# Patient Record
(demographics unavailable — no encounter records)

---

## 2022-06-30 RX ORDER — MONTELUKAST SODIUM 10 MG/1
TABLET ORAL
COMMUNITY
Start: 2021-07-01

## 2022-06-30 RX ORDER — FLUOXETINE HYDROCHLORIDE 20 MG/1
1 CAPSULE ORAL
COMMUNITY
Start: 2022-04-19

## 2022-06-30 RX ORDER — INSULIN DEGLUDEC INJECTION 100 U/ML
INJECTION, SOLUTION SUBCUTANEOUS
COMMUNITY
Start: 2022-04-19

## 2022-07-20 LAB
ANION GAP SERPL CALC-SCNC: 11 MMOL/L (ref 2–17)
APPEARANCE UR: CLEAR
APPEARANCE: CLEAR
BASOPHILS # BLD AUTO: 0.1 X10E3/MCL (ref 0–0.2)
BASOPHILS NFR BLD AUTO: 1.2 % (ref 0–2)
BILIRUB UR STRIP.AUTO-MCNC: NEGATIVE MG/DL
BILIRUBIN, URINE, POC: NEGATIVE
BLOOD URINE, POC: ABNORMAL
BUN SERPL-MCNC: 14 MG/DL (ref 6–20)
CALCIUM SERPL-MCNC: 9.5 MG/DL (ref 8.6–10)
CHLORIDE SERPL-SCNC: 98 MMOL/L (ref 98–107)
COLOR UR: YELLOW
CREAT SERPL-MCNC: 0.5 MG/DL (ref 0.5–1)
DEPRECATED HCO3 PLAS-SCNC: 23 MMOL/L (ref 22–29)
EOSINOPHIL # BLD AUTO: 0.2 X10E3/MCL (ref 0–0.5)
EOSINOPHIL NFR BLD AUTO: 3.2 % (ref 0–7)
ERYTHROCYTE [DISTWIDTH] IN BLOOD BY AUTOMATED COUNT: 12.6 % (ref 11–16)
GFR SERPL CREATININE-BSD FRML MDRD: 108 ML/MIN/1.73M²
GLUCOSE BLD-MCNC: 340 MG/DL (ref 65–110)
GLUCOSE BLD-MCNC: 413 MG/DL (ref 65–110)
GLUCOSE SERPL-MCNC: 379 MG/DL (ref 70–99)
GLUCOSE UR STRIP.AUTO-MCNC: >=1000 MG/DL
GLUCOSE URINE, POC: >=1000 MG/DL
HCT VFR BLD AUTO: 41.8 % (ref 34–47)
HGB BLD-MCNC: 14.6 G/DL (ref 11.5–15.7)
IMMATURE GRANS (ABS): 0.01 X10E3/MCL (ref 0–0.06)
IMMATURE GRANULOCYTES: 0.1 % (ref 0–0.6)
KETONES UR STRIP.AUTO-MCNC: NEGATIVE MG/DL
KETONES, URINE, POC: NEGATIVE MG/DL
KETONES: NEGATIVE
LEUKOCYTE EST, POC: NEGATIVE
LEUKOCYTE ESTERASE UR QL STRIP: NEGATIVE
LYMPHOCYTES # SPEC AUTO: 2.3 X10E3/MCL (ref 1–3.2)
LYMPHOCYTES NFR BLD AUTO: 31.2 % (ref 15–45)
MAGNESIUM SERPL-MCNC: 2 MG/DL (ref 1.6–2.6)
MCH RBC QN AUTO: 29.1 PG (ref 27–34.5)
MCHC RBC AUTO-ENTMCNC: 34.9 G/DL (ref 32–36)
MCV RBC AUTO: 83.3 FL (ref 81–99)
MONOCYTES # BLD AUTO: 0.4 X10E3/MCL (ref 0.3–1)
MONOCYTES NFR BLD AUTO: 5.4 % (ref 4–12)
NEUTROPHILS # BLD AUTO: 4.4 X10E3/MCL (ref 1.6–7.3)
NEUTROPHILS NFR BLD AUTO: 58.9 % (ref 42–74)
NITRATE, URINE POC: NEGATIVE
NITRITE UR QL STRIP.AUTO: NEGATIVE
OSMOLALITY SERPL CALC.SUM OF ELEC: 281 MOSM/KG (ref 270–287)
PH UR STRIP.AUTO: 5.5 [PH] (ref 4.5–8)
PH, URINE, POC: 5.5 (ref 4.5–8)
PLATELET # BLD AUTO: 226 X10E3/MCL (ref 140–440)
PMV BLD AUTO: 10.2 FL (ref 7.2–13.2)
POTASSIUM SERPL-SCNC: 4.3 MMOL/L (ref 3.5–5.3)
POTASSIUM SERPL-SCNC: ABNORMAL MMOL/L (ref 3.5–5.3)
PROT UR QL STRIP: NEGATIVE
PROTEIN,URINE, POC: NEGATIVE
RBC # BLD AUTO: 5.02 X10E6/MCL (ref 3.6–5.2)
RBC # UR STRIP: NEGATIVE /UL
SODIUM SERPL-SCNC: 132 MMOL/L (ref 135–145)
SP GR UR STRIP: 1.02 (ref 1–1.03)
SPECIFIC GRAVITY, URINE, POC: 1.02 (ref 1–1.03)
URINALYSIS COLOR, POC: YELLOW
UROBILIN U POC: 0.2 EU/DL
UROBILINOGEN UR STRIP-MCNC: 0.2 EU/DL
WBC # BLD AUTO: 7.4 X10E3/MCL (ref 3.8–10.6)

## 2022-07-21 LAB
FINAL REPORT: NORMAL

## 2022-07-22 LAB
FINAL REPORT: NORMAL
ORGANISM: NORMAL

## 2022-10-17 NOTE — DISCHARGE SUMMARY
ED Clinical Summary                         [facility]  [address]  [place], [place], [address] [phone]           PERSON INFORMATION  Name: Claudio Natarajan Age:  61 Years : 1962   Sex: Female Language: English PCP: Rowena SNYDER   Marital Status:   Phone: [phone] Med Service: MED-Medicine   MRN:  [id number] Acct# [de-identified] Arrival: 2022 09:24:00   Visit Reason: Dysuria; Flank pain; LOWER BACK PAIN/PAINFUL URINATION Acuity: 3 LOS: 000 02:14   Address:      [address] [place] [address]  Diagnosis:      Back pain; Hyperglycemia; Noncompliance with medication regimen; Vaginal pruritus  Printed Prescriptions: Allergies      sulfa drugs (Itching)      Medications Administered During Visit:                  Medication Dose Route   Sodium Chloride 0.9% 1000 mL IV Piggyback   ketorolac 15 mg IV Push       Patient Medication List:              azelastine nasal (azelastine 137 mcg/inh ( 0.1%) nasal spray) Nasal (into the nose) 2 times a day. cyclobenzaprine (Flexeril 10 mg oral tablet) 1 Tabs Oral (given by mouth) At Bedtime (Once a Day) as needed moderate pain (4-7) for 15 Days. Refills: 0., \" THIS MEDICATION IS ASSOCIATED  WITH  AN INCREASED RISK OF FALLS. \"  fluconazole (Diflucan 150 mg oral tablet) take 1 tab every 3 days as needed for yeast infection. Refills: 0.  hydrOXYzine (hydrOXYzine hydrochloride 25 mg oral tablet) 1 Tabs Oral (given by mouth) 4 times a day as needed for anxiety., SOUND ALIKE / LOOK ALIKE - VERIFY DRUG  lidocaine topical (lidocaine 4% topical film) 1 Patches Topical (on the skin) every day for 30 Days. Refills: 0.  lisinopril (lisinopril 40 mg oral tablet) 1 Tabs Oral (given by mouth) once a day (in the morning). meclizine (Antivert 25 mg oral tablet) Oral (given by mouth) 3 times a day. metFORMIN (metFORMIN 500 mg oral tablet) 2 Tabs Oral (given by mouth) 2 times a day.  2 tablets in the morning and 1 tablet in the evening. naproxen (naproxen sodium) Oral (given by mouth). omeprazole (PriLOSEC 40 mg oral delayed release capsule) 1 Capsules Oral (given by mouth) every day. rosuvastatin (rosuvastatin 10 mg oral tablet) 1 Tabs Oral (given by mouth) every day. sertraline (sertraline 50 mg oral tablet) 2 Tabs Oral (given by mouth) once a day (in the morning). , SOUND ALIKE / LOOK ALIKE - VERIFY DRUG  sitaGLIPtin (Januvia 50 mg oral tablet) 1 Tabs Oral (given by mouth) every day. venlafaxine (Effexor XR 37.5 mg oral capsule, extended release) 1 Capsules Oral (given by mouth) every day. Major Tests and Procedures: The following procedures and tests were performed during your ED visit. COMMONPROCEDURES%>  COMMON PROCEDURESCOMMENTS%>          Laboratory Orders  Name Status Details   . Glu POC Completed Blood, RT, RT - Routine, Collected, 07/20/22 9:40:02 EDT, Nurse collect, 07/20/22 9:40:02 /[place], [facility] POC Login   . Glu POC Completed Blood, RT, RT - Routine, Collected, 07/20/22 10:47:43 EDT, Nurse collect, 07/20/22 10:47:43 /[place], [id number] POC Login   . UA POC Completed Urine, RT, RT - Routine, Collected, 07/20/22 9:33:00 EDT, Nurse collect, 07/20/22 9:33:00 /[place], RAL POC Login   BMP Completed Blood, Stat, ST - Stat, 07/20/22 9:41:00 EDT, 07/20/22 9:41:00 EDT, Nurse Kathy salvador, Print label Y/N   C Urine Ordered Urine, Clean Catch, Stat, ST - Stat, 07/20/22 9:29:00 EDT, 07/20/22 9:29:00 EDT, Nurse collect   CBCDIFF Completed Blood, Stat, ST - Stat, 07/20/22 9:41:00 EDT, 07/20/22 9:41:00 EDT, Nurse Kathy salvador, Print label Y/N   K Completed Blood, Stat, ST - Stat, 07/20/22 10:36:00 EDT, Once, 07/20/22 10:36:00 EDT, Nurse collectKathy, Print label Y/N   Ketone Completed Blood, Stat, ST - Stat, 07/20/22 9:41:00 EDT, 07/20/22 9:41:00 EDT, Nurse Kathy salvador, Print label Y/N   Mg Completed Blood, Stat, ST - Stat, 07/20/22 9:41:00 EDT, 07/20/22 9:41:00 EDT, Nurse collect, Prabha Ovalle, Print label Y/N   UA Rflx John Paul Completed Urine, Clean Catch, Stat, ST - Stat, 07/20/22 9:29:00 EDT, Once, 07/20/22 9:29:00 EDT, Nurse collect, Prabha Vasquez, Print label Y/N               Radiology Orders  No radiology orders were placed. Patient Care Orders  Name Status Details   Cardiac/NIBP/Pulse Ox Monitoring Completed 07/20/22 9:41:00 EDT, This message can only be seen by Nursing, it is not visible to Pharmacy, Laboratory, or Radiology. , 07/20/22 9:41:00 EDT, 07/20/22 9:41:00 EDT, Once   Discharge Patient Ordered 07/20/22 11:28:00 EDT   ED Assessment Adult Completed 07/20/22 9:28:18 EDT, 07/20/22 9:28:18 EDT   ED Secondary Triage Completed 07/20/22 9:28:18 EDT, 07/20/22 9:28:18 EDT   ED Triage Adult Completed 07/20/22 9:24:13 EDT, 07/20/22 9:24:13 EDT   POC-Blood Glucose Monitoring Completed 07/20/22 9:38:00 EDT, Once, 07/20/22 9:38:00 EDT   POC-Blood Glucose Monitoring Completed 07/20/22 10:41:00 EDT, Once, 07/20/22 10:41:00 EDT   POC-Urine Dipstick collect Completed 07/20/22 9:37:00 EDT, Once, 07/20/22 9:37:00 EDT   Saline Lock Insert Completed 07/20/22 9:41:00 EDT, Once, 07/20/22 9:41:00 EDT             PROVIDER INFORMATION               Provider Role Assigned Wanda Jones [facility] - INPATIENT ED MidLevel 7/20/2022 09:27:49    Terence Davis RN, Emily Ballesteros ED Nurse 7/20/2022 09:28:20        Attending Physician:  Lisa Wilder Doc  KODY WEISS-PAC     Consulting Doc       VITALS INFORMATION  Vital Sign Triage Latest   Temp Oral ORAL_1%>36.9 degC ORAL%>36.9 degC   Temp Temporal TEMPORAL_1%> TEMPORAL%>   Temp Intravascular INTRAVASCULAR_1%> INTRAVASCULAR%>   Temp Axillary AXILLARY_1%> AXILLARY%>   Temp Rectal RECTAL_1%> RECTAL%>   02 Sat 98 % 97 %   Respiratory Rate RATE_1%>20 br/min RATE%>15 br/min   Peripheral Pulse Rate PULSE RATE_1%>92 bpm PULSE RATE%>81 bpm   Apical Heart Rate HEART RATE_1%> HEART RATE%>   Blood Pressure BLOOD PRESSURE_1%>/ BLOOD PRESSURE_1%>75 mmHg BLOOD PRESSURE%>114 mmHg / BLOOD PRESSURE%>68 mmHg                 Immunizations      No Immunizations Documented This Visit          DISCHARGE INFORMATION   Discharge Disposition: H Outpt-Sent Home   Discharge Location:    Home   Discharge Date and Time:    2022 11:38:53   ED Checkout Date and Time:    2022 11:38:53     DEPART REASON INCOMPLETE INFORMATION               Depart Action Incomplete Reason   Interactive View/I&O Recently assessed               Problems      No Problems Documented              Smoking Status      10 or more cigarettes (1/2 pack or more)/day in last 30 days         PATIENT EDUCATION INFORMATION  Instructions:       Hyperglycemia     Follow up:                    With: Address: When:   Shanique Romo [address] [place] [address]  [phone] Business ([employer] Within 1 week           ED PROVIDER DOCUMENTATION     Patient:   Pau Granados             MRN: [id number]            FIN: [id number]               Age:   61 years     Sex:  Female     :  1962   Associated Diagnoses:   Vaginal pruritus; Back pain; Hyperglycemia; Noncompliance with medication regimen   Author:   Travis Lechuga      Basic Information   Time seen: Provider Seen (ST)   ED Provider/Time:    Travis Lechuga / 2022 09:27  . Additional information: Chief Complaint from Nursing Triage Note   Chief Complaint  Chief Complaint: pt c/o low back pain x1week. reports dysuria. denies fever, diarrhea. ambulatory, in NAD (22 09:25:00). History of Present Illness   The patient presents 77-year-old  female with past medical history of diabetes coming in with a chief complaint of general pruritus and irritation that started 1 week ago and then dysuria and lower back pain that started 3 days ago. No inciting trauma nor mechanism of injury with the lower back. No radiation into the legs.   No bowel/bladder incontinence/retention and no saddle anesthesia. No history of any back surgeries. No immunocompromise state nor malignancy. Patient states that she does not like taking her diabetes medicine because it causes yeast infections. Her blood sugars have been on the higher side around 300s to 400s. She is waiting for her insurance to follow-up with her primary care doctor. To switch medicines and it should be the next 2 weeks. .        Review of Systems   Constitutional symptoms:  No fever, no chills, no sweats, no fatigue. Skin symptoms:  No jaundice, no rash. Eye symptoms:  Vision unchanged. Respiratory symptoms:  No shortness of breath, no cough. Cardiovascular symptoms:  No chest pain, no palpitations, no peripheral edema. Gastrointestinal symptoms:  No abdominal pain, no nausea, no vomiting, no diarrhea. Genitourinary symptoms:  Dysuria, vaginal discharge. Musculoskeletal symptoms:  Back pain. Neurologic symptoms:  No headache, no numbness, no tingling. Hematologic/Lymphatic symptoms:  Bleeding tendency negative,    Allergy/immunologic symptoms:  No impaired immunity,              Additional review of systems information: All other systems reviewed and otherwise negative. Health Status   Allergies: Allergic Reactions (Selected)  Moderate  Sulfa drugs- Itching. .   Medications:  (Selected)   Documented Medications  Documented  Ozempic (0.25 mg or 0.5 mg dose) 2 mg/1.5 mL subcutaneous solution: 0.5 mg, Subcutaneous, qWeek, MONDAY, 0 Refill(s)  Zofran 4 mg oral tablet: 4 mg, 1 tabs, Oral, q8hr, PRN: as needed for nausea/vomiting, 0 Refill(s)  lisinopril 40 mg oral tablet: 40 mg, 1 tabs, Oral, qAM, 0 Refill(s)  sertraline 50 mg oral tablet: 100 mg, 2 tabs, Oral, qAM, 0 Refill(s)  terbinafine 250 mg oral tablet: 250 mg, 1 tabs, Oral, qAM, for 14 days, 14 tabs, 0 Refill(s)  traMADol 50 mg oral tablet: 50 mg, 1 tabs, Oral, q6hr, PRN: as needed for pain, 30 EA, 1 Refill(s).       Past Medical/ Family/ Social History   Surgical history: Reviewed in chart and/or reviewed at bedside. .   Family history: Reviewed in chart and/or reviewed at bedside. .   Social history: Reviewed in chart and/or reviewed at bedside. .   Problem list:    Active Problems (9)  Anxiety   Asthma   Diabetes   GERD (gastroesophageal reflux disease)   Hypertension   Nausea & vomiting   Suspected sleep apnea   Vertigo   Wears glasses   , Reviewed in chart and/or reviewed at bedside. [facility]sh[facility] Physical Examination               Vital Signs   Vital Signs   4/28/8386 9:26 EDT Systolic Blood Pressure 538 mmHg  HI    Diastolic Blood Pressure 75 mmHg    Temperature Oral 36.9 degC    Heart Rate Monitored 97 bpm    Respiratory Rate 20 br/min    SpO2 98 %   . Measurements   7/20/2022 9:28 EDT Body Mass Index est melissa 26.24 kg/m2    Body Mass Index Measured 26.24 kg/m2   7/20/2022 9:25 EDT Height/Length Measured 158 cm    Weight Dosing 65.5 kg   . Oxygen saturation. General:  Alert, no acute distress. Skin:  Warm, dry, pink. Head:  Normocephalic, atraumatic. Neck:  Supple, trachea midline. Eye:  Extraocular movements are intact, normal conjunctiva, vision grossly normal.    Ears, nose, mouth and throat:  Oral mucosa moist.   Cardiovascular:  Regular rate and rhythm, No murmur, Normal peripheral perfusion. Respiratory:  Lungs are clear to auscultation, respirations are non-labored, breath sounds are equal, Symmetrical chest wall expansion. Back:  Normal range of motion, Normal alignment, no step-offs, No test palpation of the cervical, thoracic nor lumbar spinous process. Negative straight leg raise bilaterally. .    Musculoskeletal:  Normal ROM, no tenderness. Chest wall   Gastrointestinal:  Soft, Nontender, Non distended. Genitourinary   Neurological:  Alert and oriented to person, place, time, and situation, normal speech observed. Lymphatics   Psychiatric:  Cooperative, appropriate mood & affect. Medical Decision Making   Differential Diagnosis:  Vaginitis, urinary tract infection, candidiasis. Rationale:  49-year-old  female coming in with a chief complaint of vaginal pruritus and irritation for a week now and now dysuria starting 3 days ago. Her primary care urine is negative so UA sent off which is also negative for infection. Culture was sent off but her irritation could definitely be from a yeast infection for which she says she has vaginal pruritus and vaginal irritation. She states she gets it from her Januvia. She states she is only taking Januvia because of this and that is why her blood glucose is likely elevated here in the emergency department. No signs of DKA no laboratory work-up. I encouraged her to take her Januvia and Ozempic as prescribed I will give her Diflucan until she can follow-up with a primary care doctor to discuss alternatives in the next 2 weeks. Otherwise her back pain is likely from working a lot lately, the pain is in the lumbar spine, no red flags for back pain, worse with movement and actually feels better with palpation. Lidocaine and Flexeril right upon discharge and warned side effects and take this medication. Otherwise with a negative work-up above, vital signs stable patient no acute distress, discharge discussed with patient patient agreed with plan. Strict return precautions given. .      Impression and Plan   Diagnosis   Vaginal pruritus (OXQ65-YM N89.8, Discharge, Medical)   Back pain (KTS34-BM M54.9, Discharge, Medical)   Hyperglycemia (UNY88-HM R73.9, Discharge, Medical)   Noncompliance with medication regimen (KYH74-RV Z91.14, Discharge, Medical)   Plan   Condition: Stable. Disposition: Medically cleared, Discharged: to home.     Prescriptions: Launch prescriptions   Pharmacy:  lidocaine 4% topical film (Prescribe): 1 patches, Topical, Daily, for 30 days, 30 patches, 0 Refill(s)  Flexeril 10 mg oral tablet (Prescribe): 10 mg, 1 tabs, Oral, At Bedtime (Once a Day), for 15 days, PRN: moderate pain (4-7), 15 tabs, 0 Refill(s), Launch prescriptions   Pharmacy:  Diflucan 150 mg oral tablet (Prescribe): See Instructions, take 1 tab every 3 days as needed for yeast infection, 4 tabs, 0 Refill(s). Patient was given the following educational materials: Hyperglycemia. Follow up with: Kalia Echeverria Within 1 week. Counseled: Patient, Regarding diagnosis, Regarding diagnostic results, Regarding treatment plan, Regarding prescription, Patient indicated understanding of instructions.

## 2022-10-17 NOTE — ED NOTES
ED Patient Summary       ;          [facility] [place]  [address], [address]  [phone]  Discharge Instructions (Patient)  Blake Suarez  :  1417                   MRN: [id number]                   FIN: [id number]  Reason For Visit: Dysuria; Flank pain; LOWER BACK PAIN/PAINFUL URINATION  Final Diagnosis: Back pain; Hyperglycemia; Noncompliance with medication regimen; Vaginal pruritus     Visit Date: 2022 09:24:00  Address: [address] [place] [address] [address]  Phone: [phone]     Emergency Department Providers:         Primary Physician:      Thuy Marinelli Dr would like to thank you for allowing us to assist you with your healthcare needs. The following includes patient education materials and information regarding your injury/illness. Follow-up Instructions: You were seen today on an emergency basis. Please contact your primary care doctor for a follow up appointment. If you received a referral to a specialist doctor, it is important you follow-up as instructed. It is important that you call your follow-up doctor to schedule and confirm the location of your next appointment. Your doctor may practice at multiple locations. The office location of your follow-up appointment may be different to the one written on your discharge instructions. If you do not have a primary care doctor, please call [phone] for help in finding a [facility] Provider. For help in finding a specialist doctor, please call [phone]. If your condition gets worse before your follow-up with your primary care doctor or specialist, please return to the Emergency Department. Coronavirus 2019 (COVID-19) Reminders:     Patients age 15 - 24, with parental consent, and over age 25 can make an appointment for a COVID-19 vaccine.  Patients can contact their [facility] doctors' offices to Dose:____________________  omeprazole (PriLOSEC 40 mg oral delayed release capsule) 1 Capsules Oral (given by mouth) every day. Last Dose:____________________  rosuvastatin (rosuvastatin 10 mg oral tablet) 1 Tabs Oral (given by mouth) every day. Last Dose:____________________  sertraline (sertraline 50 mg oral tablet) 2 Tabs Oral (given by mouth) once a day (in the morning). , SOUND ALIKE / LOOK ALIKE - VERIFY DRUG  Last Dose:____________________  sitaGLIPtin (Januvia 50 mg oral tablet) 1 Tabs Oral (given by mouth) every day. Last Dose:____________________  venlafaxine (Effexor XR 37.5 mg oral capsule, extended release) 1 Capsules Oral (given by mouth) every day. Last Dose:____________________      Allergy Info: sulfa drugs     Discharge Additional Information          Discharge Patient 07/20/22 11:28:00 EDT      Patient Education Materials:        Hyperglycemia    Hyperglycemia occurs when the level of sugar (glucose) in the blood is too high. Glucose is a type of sugar that provides the body's main source of energy. Certain hormones (insulin and glucagon) control the level of glucose in the blood. Insulin lowers blood glucose, and glucagon increases blood glucose. Hyperglycemia can result from not having enough insulin in the bloodstream, or from the body not responding normally to insulin. Hyperglycemia occurs most often in people who have diabetes (diabetes mellitus), but it can happen in people who do not have diabetes. It can develop quickly, and it can be life-threatening if it causes you to become severely dehydrated (diabetic ketoacidosis or hyperglycemic hyperosmolar state). Severe hyperglycemia is a medical emergency. For most people with diabetes, a blood glucose level above 240 mg/dL is considered hyperglycemia. What are the causes? If you have diabetes, hyperglycemia may be caused by:   Medicines that increase blood glucose or affect your diabetes control.      Getting less physical as:   A fasting blood glucose (FBG) test. You will not be allowed to eat (you will fast) for at least 8 hours before a blood sample is taken. An A1c (hemoglobin A1c) blood test. This provides information about blood glucose control over the previous 2?3 months. An oral glucose tolerance test (OGTT). This measures your blood glucose at two times:  ? After fasting. This is your baseline blood glucose level. ? Two hours after drinking a beverage that contains glucose. How is this treated? Treatment depends on the cause of your hyperglycemia. Treatment may include:   Taking medicine to regulate your blood glucose levels. If you take insulin or other diabetes medicines, your medicine or dosage may be adjusted. Lifestyle changes, such as exercising more, eating healthier foods, or losing weight. Treating an illness or infection. Checking your blood glucose more often. Stopping or reducing steroid medicines. If your hyperglycemia becomes severe and it results in diabetic ketoacidosis or hyperglycemic hyperosmolar state, you must be hospitalized and given IV fluids and IV insulin. Follow these instructions at home:    General instructions     Take over-the-counter and prescription medicines only as told by your health care provider. Do not use any products that contain nicotine or tobacco, such as cigarettes, e-cigarettes, and chewing tobacco. If you need help quitting, ask your health care provider. If you drink alcohol:   ? Limit how much you use to:   ? 0?1 drink a day for women. ? 0?2 drinks a day for men. ? Be aware of how much alcohol is in your drink. In the [place] one drink equals one 12 oz bottle of beer (355 mL), one 5 oz glass of wine (148 mL), or one 1? oz glass of hard liquor (44 mL). Learn to manage stress. If you need help with this, ask your health care provider. Exercise regularly, as told by your health care provider.      Keep all follow-up visits as told by your health care provider. This is important. Eating and drinking       Maintain a healthy weight. Stay hydrated, especially when you exercise, get sick, or spend time in hot temperatures. Drink enough fluid to keep your urine pale yellow. Follow your meal plan. Eat on time. Do not skip meals. If you have diabetes:       Make sure you know the symptoms of hyperglycemia. Follow your diabetes management plan as told by your health care provider. Make sure you:  ? Take your insulin and medicines as told. ? Follow your exercise plan. ? Follow your meal plan. Eat on time, and do not skip meals. ? Check your blood glucose as often as told. Make sure to check your blood glucose before and after exercise. If you exercise longer or in a different way than usual, check your blood glucose more often. ? Follow your sick day plan whenever you cannot eat or drink normally. Make this plan in advance with your health care provider. Share your diabetes management plan with people in your workplace, school, and household. Check your urine for ketones when you are ill and as told by your health care provider. Carry a medical alert card or wear medical alert jewelry. Contact a health care provider if:     Your blood glucose is at or above 240 mg/dL (13.3 mmol/L) for 2 days in a row. You have problems keeping your blood glucose in your target range. You have frequent episodes of hyperglycemia. You have signs of illness, such as nausea, vomiting, or fever. Get help right away if:     Your blood glucose monitor reads \"high\" even when you are taking insulin. You have trouble breathing. You have a change in how you think, feel, or act (mental status). You have nausea or vomiting that does not go away. These symptoms may represent a serious problem that is an emergency. Do not wait to see if the symptoms will go away.  Get medical help right away. Call your local emergency services (911 in the [place] Do not drive yourself to the hospital.      Summary     Hyperglycemia occurs when the level of sugar (glucose) in the blood is too high. Hyperglycemia can happen with or without diabetes, and severe hyperglycemia can be life-threatening. Hyperglycemia is diagnosed with a blood test to measure your blood glucose level. This blood test is usually done while you are having symptoms. Your health care provider may also do a physical exam and review your medical history. If you have diabetes, follow your diabetes management plan as told by your health care provider. Contact your health care provider if you have problems keeping your blood glucose in your target range. This information is not intended to replace advice given to you by your health care provider. Make sure you discuss any questions you have with your health care provider. Document Revised: 12/17/2020 Document Reviewed: 11/25/2020  [employer] Patient Education ? [address] [place]      ---------------------------------------------------------------------------------------------------------------------  [facility] allows patients to review your COVID and other test results as well as discharge documents from any [facility], Emergency Department, surgical center or outpatient lab. Test results are typically available 36 hours after the test is completed. [address] encourages you to self-enroll in the [facility] Patient Portal.     To begin your self-enrollment process, please visit [email or web address] Under [facility], click on Sign up now. NOTE: You must be 16 years and older to use [facility] Self-Enroll online. If you are a parent, caregiver, or guardian; you need an invite to access your childs or dependents health records.  To obtain an invite, contact the Medical Records department at [phone] Monday through Friday, 8-4:30, select option 3 . If we receive your call afterhours, we will return your call the next business day. If you have issues trying to create or access your account, contact [employer] at [phone] available 7 days a week 24 hours a day.      Comment:

## 2022-10-17 NOTE — ED NOTES
ED Patient Education Note     Patient Education Materials Follows:  Endocrinology     Hyperglycemia    Hyperglycemia occurs when the level of sugar (glucose) in the blood is too high. Glucose is a type of sugar that provides the body's main source of energy. Certain hormones (insulin and glucagon) control the level of glucose in the blood. Insulin lowers blood glucose, and glucagon increases blood glucose. Hyperglycemia can result from not having enough insulin in the bloodstream, or from the body not responding normally to insulin. Hyperglycemia occurs most often in people who have diabetes (diabetes mellitus), but it can happen in people who do not have diabetes. It can develop quickly, and it can be life-threatening if it causes you to become severely dehydrated (diabetic ketoacidosis or hyperglycemic hyperosmolar state). Severe hyperglycemia is a medical emergency. For most people with diabetes, a blood glucose level above 240 mg/dL is considered hyperglycemia. What are the causes? If you have diabetes, hyperglycemia may be caused by:   Medicines that increase blood glucose or affect your diabetes control. Getting less physical activity. Eating more than planned. Being sick or injured, having an infection, or having surgery. Stress. Not giving yourself enough insulin (if you are taking insulin). If you have undiagnosed diabetes, this may cause hyperglycemia. If you do not have diabetes, hyperglycemia may be caused by:   Certain medicines, including:  ? Steroid medicines. ? Beta-blockers. ? Epinephrine. ? Thiazide diuretics. Stress. Having a serious illness, an infection, or surgery. Diseases of the pancreas. What increases the risk? Hyperglycemia is more likely to develop in people who have risk factors for diabetes, such as:   Having a family member with diabetes.      Certain conditions in which the body's disease-fighting system (immune system) attacks itself (autoimmune disorders). Being overweight or obese. Having an inactive (sedentary) lifestyle. Having been diagnosed with insulin resistance. Having a history of prediabetes, gestational diabetes, or polycystic ovarian syndrome (PCOS). What are the signs or symptoms? Hyperglycemia may not cause any symptoms. If you do have symptoms, they may include: Increased thirst.     Needing to urinate more often than usual.     Hunger. Feeling very tired. Blurry vision. Other symptoms may develop if hyperglycemia gets worse, such as:   Dry mouth. Abdominal pain. Loss of appetite. Fruity-smelling breath. Weakness. Unexpected weight loss. Tingling or numbness in the hands or feet. Headache. Cuts or bruises that are slow to heal.        How is this diagnosed? Hyperglycemia is diagnosed with a blood test to measure your blood glucose level. This blood test is usually done while you are having symptoms. Your health care provider may also do a physical exam and review your medical history. You may have more tests to determine the cause of your hyperglycemia, such as:   A fasting blood glucose (FBG) test. You will not be allowed to eat (you will fast) for at least 8 hours before a blood sample is taken. An A1c (hemoglobin A1c) blood test. This provides information about blood glucose control over the previous 2?3 months. An oral glucose tolerance test (OGTT). This measures your blood glucose at two times:  ? After fasting. This is your baseline blood glucose level. ? Two hours after drinking a beverage that contains glucose. How is this treated? Treatment depends on the cause of your hyperglycemia. Treatment may include:   Taking medicine to regulate your blood glucose levels. If you take insulin or other diabetes medicines, your medicine or dosage may be adjusted.      Lifestyle changes, such as exercising more, eating healthier foods, or losing weight. Treating an illness or infection. Checking your blood glucose more often. Stopping or reducing steroid medicines. If your hyperglycemia becomes severe and it results in diabetic ketoacidosis or hyperglycemic hyperosmolar state, you must be hospitalized and given IV fluids and IV insulin. Follow these instructions at home:    General instructions     Take over-the-counter and prescription medicines only as told by your health care provider. Do not use any products that contain nicotine or tobacco, such as cigarettes, e-cigarettes, and chewing tobacco. If you need help quitting, ask your health care provider. If you drink alcohol:   ? Limit how much you use to:   ? 0?1 drink a day for women. ? 0?2 drinks a day for men. ? Be aware of how much alcohol is in your drink. In the [place] one drink equals one 12 oz bottle of beer (355 mL), one 5 oz glass of wine (148 mL), or one 1? oz glass of hard liquor (44 mL). Learn to manage stress. If you need help with this, ask your health care provider. Exercise regularly, as told by your health care provider. Keep all follow-up visits as told by your health care provider. This is important. Eating and drinking       Maintain a healthy weight. Stay hydrated, especially when you exercise, get sick, or spend time in hot temperatures. Drink enough fluid to keep your urine pale yellow. Follow your meal plan. Eat on time. Do not skip meals. If you have diabetes:       Make sure you know the symptoms of hyperglycemia. Follow your diabetes management plan as told by your health care provider. Make sure you:  ? Take your insulin and medicines as told. ? Follow your exercise plan. ? Follow your meal plan. Eat on time, and do not skip meals. ? Check your blood glucose as often as told. Make sure to check your blood glucose before and after exercise.  If you exercise longer or in a different way than usual, check your blood glucose more often. ? Follow your sick day plan whenever you cannot eat or drink normally. Make this plan in advance with your health care provider. Share your diabetes management plan with people in your workplace, school, and household. Check your urine for ketones when you are ill and as told by your health care provider. Carry a medical alert card or wear medical alert jewelry. Contact a health care provider if:     Your blood glucose is at or above 240 mg/dL (13.3 mmol/L) for 2 days in a row. You have problems keeping your blood glucose in your target range. You have frequent episodes of hyperglycemia. You have signs of illness, such as nausea, vomiting, or fever. Get help right away if:     Your blood glucose monitor reads \"high\" even when you are taking insulin. You have trouble breathing. You have a change in how you think, feel, or act (mental status). You have nausea or vomiting that does not go away. These symptoms may represent a serious problem that is an emergency. Do not wait to see if the symptoms will go away. Get medical help right away. Call your local emergency services (911 in the [place] Do not drive yourself to the hospital.      Summary     Hyperglycemia occurs when the level of sugar (glucose) in the blood is too high. Hyperglycemia can happen with or without diabetes, and severe hyperglycemia can be life-threatening. Hyperglycemia is diagnosed with a blood test to measure your blood glucose level. This blood test is usually done while you are having symptoms. Your health care provider may also do a physical exam and review your medical history. If you have diabetes, follow your diabetes management plan as told by your health care provider. Contact your health care provider if you have problems keeping your blood glucose in your target range.       This information

## 2022-10-17 NOTE — ED NOTES
ED Triage Note       ED Secondary Triage Entered On:  7/20/2022 9:43 EDT    Performed On:  7/20/2022 9:43 EDT by Fei Pineda RN, Filemon Matthews               General Information   Barriers to Learning :   None evident   ED Home Meds Section :   Document assessment   [facility] ED Fall Risk Section :   Document assessment   ED Advance Directives Section :   Document assessment   ED Palliative Screen :   N/A (prefilled for <66yo)   Dickson Eden RN, Filemon Matthews - 7/20/2022 9:43 EDT   (As Of: 7/20/2022 09:43:49 EDT)   Problems(Active)    Anxiety (SNOMED CT  :77411448 )  Name of Problem:   Anxiety ; Recorder:   Jovanna Cole; Confirmation:   Confirmed ; Classification:   Patient Stated ; Code:   [id number] ; Contributor System:   PowerChart ; Last Updated:   10/8/2020 10:51 EDT ; Life Cycle Date:   10/8/2020 ; Life Cycle Status:   Active ; Vocabulary:   SNOMED CT        Asthma (SNOMED CT  :822733992 )  Name of Problem:   Asthma ; Recorder:   Shell RAY; Confirmation:   Confirmed ; Classification:   Patient Stated ; Code:   [id number] ; Contributor System:   PowerChart ; Last Updated:   10/8/2020 10:49 EDT ; Life Cycle Date:   10/8/2020 ; Life Cycle Status:   Active ; Vocabulary:   SNOMED CT        Diabetes (SNOMED CT  :926111305 )  Name of Problem:   Diabetes ; Recorder:   Urban Duarte [facility]; Confirmation:   Confirmed ; Classification:   Patient Stated ; Code:   [id number] ; Contributor System:   PowerChart ; Last Updated:   2/14/2020 12:32 EST ; Life Cycle Date:   2/14/2020 ; Life Cycle Status:   Active ; Vocabulary:   SNOMED CT        GERD (gastroesophageal reflux disease) (SNOMED CT  :952668002 )  Name of Problem:   GERD (gastroesophageal reflux disease) ; Recorder:   Jovanna Cole; Confirmation:   Confirmed ; Classification:   Patient Stated ; Code:   [id number] ; Contributor System:   PowerChart ; Last Updated:   10/8/2020 10:48 EDT ;  Life Cycle Date:   10/8/2020 ; Life Cycle Status:   Active ; Vocabulary:   SNOMED Classification:   Medical ; Clinical Service:   Emergency medicine ; Code:   PNED ; Probability:   0 ; Diagnosis Code:   [id number]      Flank pain  Date:   2022 ; Diagnosis Type:   Reason For Visit ; Confirmation:   Complaint of ; Clinical Dx:   Flank pain ; Classification:   Medical ; Clinical Service:   Emergency medicine ; Code:   PNED ; Probability:   0 ; Diagnosis Code:   [id number]             -    Procedure History   (As Of: 2022 09:43:49 EDT)     Anesthesia Minutes:   0 ; Procedure Name:   Partial hysterectomy ; Procedure Minutes:   0            Anesthesia Minutes:   0 ; Procedure Name:    delivery ; Procedure Minutes:   0            Procedure Dt/Tm:   10/13/2020 14:13:00 EDT ; Location:    Endoscopy ; Provider:   Michelle BURT; Anesthesia Type:   Monitored Anesthesia Care ; :   Gwendolyn ASENCIO; Anesthesia Minutes:   0 ; Procedure Name:   Mary Yang ; Procedure Minutes:   5 ; Comments:     10/13/2020 14:22 EDT - Gwyn Stevens RN, Rishabh Espino  auto-populated from documented surgical case ; Clinical Service:   Surgery            Procedure Dt/Tm:   10/13/2020 14:13:00 EDT ; Location:    Endoscopy ; Provider:   Michelle BURT; Anesthesia Type:   Monitored Anesthesia Care ; :   Gwendolyn ASENCIO; Anesthesia Minutes:   0 ; Procedure Name:   Mary Yang with Biopsy / Deann Less ; Procedure Minutes:   5 ; Comments:     10/13/2020 14:22 EDT - Gwyn Stevens RN, Wandalee Pope from documented surgical case ; Clinical Service:   Surgery            Procedure Dt/Tm:   2013 ;  Anesthesia Minutes:   0 ; Procedure Name:   Colonoscopy ; Procedure Minutes:   0            ED Advance Directive   Advance Directive :   Denise Franco RN, Adelso Carolina - 2022 9:43 EDT   Med Hx   Medication List   (As Of: 2022 09:43:49 EDT)   Normal Order    Sodium Chloride 0.9% intravenous solution Bolus  :   Sodium Chloride 0.9% intravenous solution Bolus ; Status:   Ordered ; Ordered As Mnemonic:   Sodium Chloride 0.9% bolus ; Simple Display Line:   1,000 mL, 1000 mL/hr, IV Piggyback, Once ; Ordering Provider:   Kathy Santacruz; Catalog Code:   Sodium Chloride 0.9% ; Order Dt/Tm:   7/20/2022 09:41:38 EDT            Home Meds    naproxen  :   naproxen ; Status:   Documented ; Ordered As Mnemonic:   naproxen sodium ; Simple Display Line:   Oral, 0 Refill(s) ; Catalog Code:   naproxen ; Order Dt/Tm:   7/20/2022 09:43:43 EDT          ondansetron  :   ondansetron ; Status:   Completed ; Ordered As Mnemonic:   Zofran 4 mg oral tablet ; Simple Display Line:   4 mg, 1 tabs, Oral, q8hr, PRN: as needed for nausea/vomiting, 0 Refill(s) ; Catalog Code:   ondansetron ; Order Dt/Tm:   10/8/2020 10:46:30 EDT          traMADol  :   traMADol ; Status:   Completed ; Ordered As Mnemonic:   traMADol 50 mg oral tablet ; Simple Display Line:   50 mg, 1 tabs, Oral, q6hr, PRN: as needed for pain, 30 EA, 1 Refill(s) ; Catalog Code:   traMADol ; Order Dt/Tm:   10/8/2020 10:46:19 EDT          terbinafine  :   terbinafine ; Status:   Completed ; Ordered As Mnemonic:   terbinafine 250 mg oral tablet ; Simple Display Line:   250 mg, 1 tabs, Oral, qAM, for 14 days, 14 tabs, 0 Refill(s) ; Catalog Code:   terbinafine ; Order Dt/Tm:   10/8/2020 10:47:59 EDT ; Comment:   SOUND ALIKE / LOOK ALIKE - VERIFY DRUG          semaglutide  :   semaglutide ; Status:   Documented ; Ordered As Mnemonic:   Ozempic (0.25 mg or 0.5 mg dose) 2 mg/1.5 mL subcutaneous solution ; Simple Display Line:   0.5 mg, Subcutaneous, qWeek, MONDAY, 0 Refill(s) ; Catalog Code:   semaglutide ; Order Dt/Tm:   10/8/2020 10:47:59 EDT          sertraline  :   sertraline ; Status:   Documented ; Ordered As Mnemonic:   sertraline 50 mg oral tablet ; Simple Display Line:   100 mg, 2 tabs, Oral, qAM, 0 Refill(s) ;  Ordering Provider:   Avinash Billingsley; Catalog Code:   sertraline ; Order Dt/Tm: 3/27/2020 10:35:41 EDT ; Comment:   SOUND ALIKE / LOOK ALIKE - VERIFY DRUG          lisinopril  :   lisinopril ; Status:   Documented ; Ordered As Mnemonic:   lisinopril 40 mg oral tablet ; Simple Display Line:   40 mg, 1 tabs, Oral, qAM, 0 Refill(s) ;  Catalog Code:   lisinopril ; Order Dt/Tm:   2/14/2020 12:34:15 EST

## 2022-10-17 NOTE — ED NOTES
ED Triage Note       ED Triage Adult Entered On:  7/20/2022 9:28 EDT    Performed On:  7/20/2022 9:25 EDT by Hank Beckford RN, CHAVO SANCHEZ               [facility]   Numeric Rating Pain Scale :   10 = Worst possible pain   Chief Complaint :   pt c/o low back pain x1week. reports dysuria. denies fever, diarrhea. ambulatory, in [facility] ([facility]) Mode of Arrival :   Private vehicle   Infectious Disease Documentation :   Document assessment   Temperature Oral :   36.9 degC(Converted to: 98.4 degF)    Heart Rate Monitored :   97 bpm   Respiratory Rate :   20 br/min   Systolic Blood Pressure :   153 mmHg (HI)    Diastolic Blood Pressure :   75 mmHg   SpO2 :   98 %   Oxygen Therapy :   Room air   Patient presentation :   None of the above   Chief Complaint or Presentation suggest infection :   No   Weight Dosing :   65.5 kg(Converted to: 144 lb 6 oz)    Height :   158 cm(Converted to: 5 ft 2 in)    Body Mass Index Dosing :   26 kg/m2   REBEKAH LOPEZ ANTHONY M - 7/20/2022 9:25 EDT   DCP GENERIC CODE   Tracking Acuity :   3   Tracking Group :   ED Open Me, Govind WELCH - 7/20/2022 9:25 EDT   ED General Section :   Document assessment   Pregnancy Status :   Patient denies   ED Allergies Section :   Document assessment   ED Reason for Visit Section :   Document assessment   REBEKAH LOPEZ Wally Chyle - 7/20/2022 9:25 EDT   ID Risk Screen Symptoms   Recent Travel History :   No recent travel   TB Symptom Screen :   No symptoms   Last 90 days COVID-19 ID :   No   Close Contact with COVID-19 ID :   No   Last 14 days COVID-19 ID :   No   REBEKAH LOPEZ ANTHONY M - 7/20/2022 9:25 EDT   Allergies   (As Of: 7/20/2022 09:28:18 EDT)   Allergies (Active)   sulfa drugs  Estimated Onset Date:   Unspecified ; Reactions:   Itching ; Created By:   Chanelle Whatley; Reaction Status:   Active ; Category:   Drug ; Substance:   sulfa drugs ; Type: Allergy ; Severity:   Moderate ; Updated By:   Chanelle Whatley;  Reviewed Date:   7/20/2022 9:27 EDT        Psycho-Social   Last 3 mo, thoughts killing self/others :   Patient denies   Right click within box for Suspected Abuse policy link. :   None   Feels Safe Where Live :   Yes   ED Behavioral Activity Rating Scale :   4 - Quiet and awake (normal level of activity)   REBEKAH LOPEZ, Shahnaz Olson - 7/20/2022 9:25 EDT   ED Reason for Visit   (As Of: 7/20/2022 09:28:18 EDT)   Problems(Active)    Anxiety (SNOMED CT  :82381824 )  Name of Problem:   Anxiety ; Recorder:   Tiajamal Glaze; Confirmation:   Confirmed ; Classification:   Patient Stated ; Code:   29703962 ; Contributor System:   PowerChart ; Last Updated:   10/8/2020 10:51 EDT ; Life Cycle Date:   10/8/2020 ; Life Cycle Status:   Active ; Vocabulary:   SNOMED CT        Asthma (SNOMED CT  :632840047 )  Name of Problem:   Asthma ; Recorder:   Kev RAY; Confirmation:   Confirmed ; Classification:   Patient Stated ; Code:   793576311 ; Contributor System:   PowerChart ; Last Updated:   10/8/2020 10:49 EDT ; Life Cycle Date:   10/8/2020 ; Life Cycle Status:   Active ; Vocabulary:   SNOMED CT        Diabetes (SNOMED CT  :306743928 )  Name of Problem:   Diabetes ; Recorder:   Kelli Ocasio; Confirmation:   Confirmed ; Classification:   Patient Stated ; Code:   659108453 ; Contributor System:   PowerChart ; Last Updated:   2/14/2020 12:32 EST ; Life Cycle Date:   2/14/2020 ; Life Cycle Status:   Active ; Vocabulary:   SNOMED CT        GERD (gastroesophageal reflux disease) (SNOMED CT  :196132714 )  Name of Problem:   GERD (gastroesophageal reflux disease) ; Recorder:   Mary Herrera; Confirmation:   Confirmed ; Classification:   Patient Stated ; Code:   815148263 ; Contributor System:   PowerChart ; Last Updated:   10/8/2020 10:48 EDT ; Life Cycle Date:   10/8/2020 ; Life Cycle Status:   Active ; Vocabulary:   SNOMED CT        Hypertension (SNOMED CT  :0778779947 )  Name of Problem:   Hypertension ;  Recorder:   Kev RAY; Confirmation: Confirmed ; Classification:   Patient Stated ; Code:   9187890105 ; Contributor System:   PowerChart ; Last Updated:   10/8/2020 10:49 EDT ; Life Cycle Date:   10/8/2020 ; Life Cycle Status:   Active ; Vocabulary:   SNOMED CT        Nausea & vomiting (SNOMED CT  :03897163 )  Name of Problem:   Nausea & vomiting ; Recorder:   Judge Lianna RAY; Confirmation:   Confirmed ; Classification:   Patient Stated ; Code:   92762666 ; Contributor System:   PowerChart ; Last Updated:   10/8/2020 10:50 EDT ; Life Cycle Date:   10/8/2020 ; Life Cycle Status:   Active ; Vocabulary:   SNOMED CT        Suspected sleep apnea (SNOMED CT  :13105729 )  Name of Problem:   Suspected sleep apnea ; Recorder:   Chiara Crane; Confirmation:   Confirmed ; Classification:   Patient Stated ; Code:   25188171 ; Contributor System:   [employer] ; Last Updated:   10/8/2020 10:50 EDT ; Life Cycle Date:   10/8/2020 ; Life Cycle Status:   Active ; Vocabulary:   SNOMED CT        Vertigo (SNOMED CT  :4411434181 )  Name of Problem:   Vertigo ; Recorder:   Zeynep Griffin; Confirmation:   Confirmed ; Classification:   Patient Stated ; Code:   1050431465 ; Contributor System:   PowerChart ; Last Updated:   2/14/2020 12:32 EST ; Life Cycle Date:   2/14/2020 ; Life Cycle Status:   Active ; Vocabulary:   SNOMED CT        Wears glasses (SNOMED CT  :015915962 )  Name of Problem:   Wears glasses ; Recorder:   Chiara Crane; Confirmation:   Confirmed ; Classification:   Patient Stated ; Code:   896658734 ; Contributor System:   PowerChart ; Last Updated:   10/8/2020 10:50 EDT ;  Life Cycle Date:   10/8/2020 ; Life Cycle Status:   Active ; Vocabulary:   SNOMED CT          Diagnoses(Active)    Dysuria  Date:   7/20/2022 ; Diagnosis Type:   Reason For Visit ; Confirmation:   Complaint of ; Clinical Dx:   Dysuria ; Classification:   Medical ; Clinical Service:   Emergency medicine ; Code:   PNED ; Probability:   0 ; Diagnosis Code: [id number]      Flank pain  Date:   7/20/2022 ; Diagnosis Type:   Reason For Visit ; Confirmation:   Complaint of ; Clinical Dx:   Flank pain ; Classification:   Medical ; Clinical Service:   Emergency medicine ; Code:   PNED ; Probability:   0 ; Diagnosis Code:   [id number]

## 2022-10-17 NOTE — ED PROVIDER NOTES
Genitourinary Problem *ED        Patient:   Tarah Vazquez             MRN: [id number]            FIN: [id number]               Age:   61 years     Sex:  Female     :  1962   Associated Diagnoses:   Vaginal pruritus; Back pain; Hyperglycemia; Noncompliance with medication regimen   Author:   Long Mcfadden      Basic Information   Time seen: Provider Seen (ST)   ED Provider/Time:    Long Mcfadden / 2022 09:27  . Additional information: Chief Complaint from Nursing Triage Note   Chief Complaint  Chief Complaint: pt c/o low back pain x1week. reports dysuria. denies fever, diarrhea. ambulatory, in NAD (22 09:25:00). History of Present Illness   The patient presents 79-year-old  female with past medical history of diabetes coming in with a chief complaint of general pruritus and irritation that started 1 week ago and then dysuria and lower back pain that started 3 days ago. No inciting trauma nor mechanism of injury with the lower back. No radiation into the legs. No bowel/bladder incontinence/retention and no saddle anesthesia. No history of any back surgeries. No immunocompromise state nor malignancy. Patient states that she does not like taking her diabetes medicine because it causes yeast infections. Her blood sugars have been on the higher side around 300s to 400s. She is waiting for her insurance to follow-up with her primary care doctor. To switch medicines and it should be the next 2 weeks. .        Review of Systems   Constitutional symptoms:  No fever, no chills, no sweats, no fatigue. Skin symptoms:  No jaundice, no rash. Eye symptoms:  Vision unchanged. Respiratory symptoms:  No shortness of breath, no cough. Cardiovascular symptoms:  No chest pain, no palpitations, no peripheral edema. Gastrointestinal symptoms:  No abdominal pain, no nausea, no vomiting, no diarrhea. Genitourinary symptoms:  Dysuria, vaginal discharge.     Musculoskeletal symptoms:  Back pain. Neurologic symptoms:  No headache, no numbness, no tingling. Hematologic/Lymphatic symptoms:  Bleeding tendency negative,    Allergy/immunologic symptoms:  No impaired immunity,              Additional review of systems information: All other systems reviewed and otherwise negative. Health Status   Allergies: Allergic Reactions (Selected)  Moderate  Sulfa drugs- Itching. .   Medications:  (Selected)   Documented Medications  Documented  Ozempic (0.25 mg or 0.5 mg dose) 2 mg/1.5 mL subcutaneous solution: 0.5 mg, Subcutaneous, qWeek, MONDAY, 0 Refill(s)  Zofran 4 mg oral tablet: 4 mg, 1 tabs, Oral, q8hr, PRN: as needed for nausea/vomiting, 0 Refill(s)  lisinopril 40 mg oral tablet: 40 mg, 1 tabs, Oral, qAM, 0 Refill(s)  sertraline 50 mg oral tablet: 100 mg, 2 tabs, Oral, qAM, 0 Refill(s)  terbinafine 250 mg oral tablet: 250 mg, 1 tabs, Oral, qAM, for 14 days, 14 tabs, 0 Refill(s)  traMADol 50 mg oral tablet: 50 mg, 1 tabs, Oral, q6hr, PRN: as needed for pain, 30 EA, 1 Refill(s). Past Medical/ Family/ Social History   Surgical history: Reviewed in chart and/or reviewed at bedside. .   Family history: Reviewed in chart and/or reviewed at bedside. .   Social history: Reviewed in chart and/or reviewed at bedside. .   Problem list:    Active Problems (9)  Anxiety   Asthma   Diabetes   GERD (gastroesophageal reflux disease)   Hypertension   Nausea & vomiting   Suspected sleep apnea   Vertigo   Wears glasses   , Reviewed in chart and/or reviewed at bedside. Elizabeth Vang Physical Examination               Vital Signs   Vital Signs   6/29/2195 4:73 EDT Systolic Blood Pressure 783 mmHg  HI    Diastolic Blood Pressure 75 mmHg    Temperature Oral 36.9 degC    Heart Rate Monitored 97 bpm    Respiratory Rate 20 br/min    SpO2 98 %   .    Measurements   7/20/2022 9:28 EDT Body Mass Index est melissa 26.24 kg/m2    Body Mass Index Measured 26.24 kg/m2   7/20/2022 9:25 EDT Height/Length Measured 158 cm Weight Dosing 65.5 kg   . Oxygen saturation. General:  Alert, no acute distress. Skin:  Warm, dry, pink. Head:  Normocephalic, atraumatic. Neck:  Supple, trachea midline. Eye:  Extraocular movements are intact, normal conjunctiva, vision grossly normal.    Ears, nose, mouth and throat:  Oral mucosa moist.   Cardiovascular:  Regular rate and rhythm, No murmur, Normal peripheral perfusion. Respiratory:  Lungs are clear to auscultation, respirations are non-labored, breath sounds are equal, Symmetrical chest wall expansion. Back:  Normal range of motion, Normal alignment, no step-offs, No test palpation of the cervical, thoracic nor lumbar spinous process. Negative straight leg raise bilaterally. .    Musculoskeletal:  Normal ROM, no tenderness. Chest wall   Gastrointestinal:  Soft, Nontender, Non distended. Genitourinary   Neurological:  Alert and oriented to person, place, time, and situation, normal speech observed. Lymphatics   Psychiatric:  Cooperative, appropriate mood & affect. Medical Decision Making   Differential Diagnosis:  Vaginitis, urinary tract infection, candidiasis. Rationale:  70-year-old  female coming in with a chief complaint of vaginal pruritus and irritation for a week now and now dysuria starting 3 days ago. Her primary care urine is negative so UA sent off which is also negative for infection. Culture was sent off but her irritation could definitely be from a yeast infection for which she says she has vaginal pruritus and vaginal irritation. She states she gets it from her Januvia. She states she is only taking Januvia because of this and that is why her blood glucose is likely elevated here in the emergency department. No signs of DKA no laboratory work-up. I encouraged her to take her Januvia and Ozempic as prescribed I will give her Diflucan until she can follow-up with a primary care doctor to discuss alternatives in the next 2 weeks. Otherwise her back pain is likely from working a lot lately, the pain is in the lumbar spine, no red flags for back pain, worse with movement and actually feels better with palpation. Lidocaine and Flexeril right upon discharge and warned side effects and take this medication. Otherwise with a negative work-up above, vital signs stable patient no acute distress, discharge discussed with patient patient agreed with plan. Strict return precautions given. .      Impression and Plan   Diagnosis   Vaginal pruritus (KBV10-QH N89.8, Discharge, Medical)   Back pain (MYK89-TN M54.9, Discharge, Medical)   Hyperglycemia (ONU78-ZW R73.9, Discharge, Medical)   Noncompliance with medication regimen (LZS55-OC Z91.14, Discharge, Medical)   Plan   Condition: Stable. Disposition: Medically cleared, Discharged: to home. Prescriptions: Launch prescriptions   Pharmacy:  lidocaine 4% topical film (Prescribe): 1 patches, Topical, Daily, for 30 days, 30 patches, 0 Refill(s)  Flexeril 10 mg oral tablet (Prescribe): 10 mg, 1 tabs, Oral, At Bedtime (Once a Day), for 15 days, PRN: moderate pain (4-7), 15 tabs, 0 Refill(s), Launch prescriptions   Pharmacy:  Diflucan 150 mg oral tablet (Prescribe): See Instructions, take 1 tab every 3 days as needed for yeast infection, 4 tabs, 0 Refill(s). Patient was given the following educational materials: Hyperglycemia. Follow up with: Nydia Parker Within 1 week. Counseled: Patient, Regarding diagnosis, Regarding diagnostic results, Regarding treatment plan, Regarding prescription, Patient indicated understanding of instructions.     Signature Line     Electronically Signed on 07/20/2022 11:28 AM EDT   ________________________________________________   Gianfranco Lares      Electronically Signed on 07/22/2022 07:37 AM EDT   ________________________________________________   Rafa KASPER            Modified by: Gianfranco Lares on 07/20/2022 10:56 AM EDT      Modified by: Ceci Arellano on 07/20/2022 11:28 AM EDT

## 2022-11-05 NOTE — ED NOTES
ED Results Callback Log     Urine Culture  Collected: 2022 Strep B  Final Body site:   Specimen Type: U CleanCatch 2022 09:54      2022 12:16 Sada Lynch, REBEKAH, Delia Reyes) No further action required, Patient Contacted Called patient/parent with results. Verified pt's  for identification. Â Called script [facility]        2022 07:25 (Naya BLACKMAN) Communication Call in a prescription for Keflex 500 mg, 4 times daily for 5 days        2022 13:19 Sada Lynch RN, Delia Reyes) Provider Review Required Urine culture came back positive. Pt has not been treated. ALLERGY SULFA.  Creatinine level normal

## 2022-11-05 NOTE — ED NOTES
ED Results Callback Log     Urine Culture  Collected: 07/20/2022 Strep B  Complete Body site:   Specimen Type: U CleanCatch 07/22/2022 14:06      07/22/2022 15:57 Neymar Aguero RN, Reyes Apa) No further action required see previous note.  Pt called and script called into pharmacy